# Patient Record
Sex: FEMALE | Race: WHITE | NOT HISPANIC OR LATINO | Employment: UNEMPLOYED | URBAN - METROPOLITAN AREA
[De-identification: names, ages, dates, MRNs, and addresses within clinical notes are randomized per-mention and may not be internally consistent; named-entity substitution may affect disease eponyms.]

---

## 2017-02-14 ENCOUNTER — HOSPITAL ENCOUNTER (EMERGENCY)
Facility: MEDICAL CENTER | Age: 32
End: 2017-02-14
Attending: EMERGENCY MEDICINE
Payer: MEDICAID

## 2017-02-14 ENCOUNTER — APPOINTMENT (OUTPATIENT)
Dept: RADIOLOGY | Facility: MEDICAL CENTER | Age: 32
End: 2017-02-14
Attending: EMERGENCY MEDICINE
Payer: MEDICAID

## 2017-02-14 VITALS
RESPIRATION RATE: 16 BRPM | HEART RATE: 104 BPM | WEIGHT: 191.14 LBS | TEMPERATURE: 99.1 F | OXYGEN SATURATION: 95 % | SYSTOLIC BLOOD PRESSURE: 140 MMHG | HEIGHT: 67 IN | BODY MASS INDEX: 30 KG/M2 | DIASTOLIC BLOOD PRESSURE: 75 MMHG

## 2017-02-14 DIAGNOSIS — K52.9 COLITIS: ICD-10-CM

## 2017-02-14 LAB
ALBUMIN SERPL BCP-MCNC: 4.3 G/DL (ref 3.2–4.9)
ALBUMIN/GLOB SERPL: 1.2 G/DL
ALP SERPL-CCNC: 65 U/L (ref 30–99)
ALT SERPL-CCNC: 21 U/L (ref 2–50)
ANION GAP SERPL CALC-SCNC: 8 MMOL/L (ref 0–11.9)
APPEARANCE UR: ABNORMAL
AST SERPL-CCNC: 29 U/L (ref 12–45)
BASOPHILS # BLD AUTO: 0.2 % (ref 0–1.8)
BASOPHILS # BLD: 0.02 K/UL (ref 0–0.12)
BILIRUB SERPL-MCNC: 0.6 MG/DL (ref 0.1–1.5)
BUN SERPL-MCNC: 14 MG/DL (ref 8–22)
CALCIUM SERPL-MCNC: 9.8 MG/DL (ref 8.4–10.2)
CHLORIDE SERPL-SCNC: 98 MMOL/L (ref 96–112)
CO2 SERPL-SCNC: 28 MMOL/L (ref 20–33)
COLOR UR: ABNORMAL
CREAT SERPL-MCNC: 1.22 MG/DL (ref 0.5–1.4)
EOSINOPHIL # BLD AUTO: 0.04 K/UL (ref 0–0.51)
EOSINOPHIL NFR BLD: 0.4 % (ref 0–6.9)
ERYTHROCYTE [DISTWIDTH] IN BLOOD BY AUTOMATED COUNT: 44.3 FL (ref 35.9–50)
GFR SERPL CREATININE-BSD FRML MDRD: 51 ML/MIN/1.73 M 2
GLOBULIN SER CALC-MCNC: 3.5 G/DL (ref 1.9–3.5)
GLUCOSE SERPL-MCNC: 123 MG/DL (ref 65–99)
GLUCOSE UR STRIP.AUTO-MCNC: NEGATIVE MG/DL
HCG UR QL: NEGATIVE
HCT VFR BLD AUTO: 42 % (ref 37–47)
HGB BLD-MCNC: 14.3 G/DL (ref 12–16)
IMM GRANULOCYTES # BLD AUTO: 0.03 K/UL (ref 0–0.11)
IMM GRANULOCYTES NFR BLD AUTO: 0.3 % (ref 0–0.9)
KETONES UR STRIP.AUTO-MCNC: ABNORMAL MG/DL
LEUKOCYTE ESTERASE UR QL STRIP.AUTO: NEGATIVE
LIPASE SERPL-CCNC: 28 U/L (ref 7–58)
LYMPHOCYTES # BLD AUTO: 1.51 K/UL (ref 1–4.8)
LYMPHOCYTES NFR BLD: 13.3 % (ref 22–41)
MCH RBC QN AUTO: 31.8 PG (ref 27–33)
MCHC RBC AUTO-ENTMCNC: 34 G/DL (ref 33.6–35)
MCV RBC AUTO: 93.5 FL (ref 81.4–97.8)
MONOCYTES # BLD AUTO: 0.52 K/UL (ref 0–0.85)
MONOCYTES NFR BLD AUTO: 4.6 % (ref 0–13.4)
NEUTROPHILS # BLD AUTO: 9.26 K/UL (ref 2–7.15)
NEUTROPHILS NFR BLD: 81.2 % (ref 44–72)
NITRITE UR QL STRIP.AUTO: NEGATIVE
NRBC # BLD AUTO: 0 K/UL
NRBC BLD AUTO-RTO: 0 /100 WBC
PH UR STRIP.AUTO: 5.5 [PH]
PLATELET # BLD AUTO: 252 K/UL (ref 164–446)
PMV BLD AUTO: 8.9 FL (ref 9–12.9)
POTASSIUM SERPL-SCNC: 3.3 MMOL/L (ref 3.6–5.5)
PROT SERPL-MCNC: 7.8 G/DL (ref 6–8.2)
PROT UR QL STRIP: 30 MG/DL
RBC # BLD AUTO: 4.49 M/UL (ref 4.2–5.4)
RBC UR QL AUTO: ABNORMAL
SODIUM SERPL-SCNC: 134 MMOL/L (ref 135–145)
SP GR UR STRIP.AUTO: >=1.03
WBC # BLD AUTO: 11.4 K/UL (ref 4.8–10.8)

## 2017-02-14 PROCEDURE — 96376 TX/PRO/DX INJ SAME DRUG ADON: CPT

## 2017-02-14 PROCEDURE — 99285 EMERGENCY DEPT VISIT HI MDM: CPT

## 2017-02-14 PROCEDURE — 81002 URINALYSIS NONAUTO W/O SCOPE: CPT

## 2017-02-14 PROCEDURE — 96374 THER/PROPH/DIAG INJ IV PUSH: CPT

## 2017-02-14 PROCEDURE — 80053 COMPREHEN METABOLIC PANEL: CPT

## 2017-02-14 PROCEDURE — 85025 COMPLETE CBC W/AUTO DIFF WBC: CPT

## 2017-02-14 PROCEDURE — 96361 HYDRATE IV INFUSION ADD-ON: CPT

## 2017-02-14 PROCEDURE — 36415 COLL VENOUS BLD VENIPUNCTURE: CPT

## 2017-02-14 PROCEDURE — 81025 URINE PREGNANCY TEST: CPT

## 2017-02-14 PROCEDURE — 700117 HCHG RX CONTRAST REV CODE 255: Performed by: EMERGENCY MEDICINE

## 2017-02-14 PROCEDURE — 700105 HCHG RX REV CODE 258: Performed by: EMERGENCY MEDICINE

## 2017-02-14 PROCEDURE — 83690 ASSAY OF LIPASE: CPT

## 2017-02-14 PROCEDURE — 96375 TX/PRO/DX INJ NEW DRUG ADDON: CPT

## 2017-02-14 PROCEDURE — 700111 HCHG RX REV CODE 636 W/ 250 OVERRIDE (IP): Performed by: EMERGENCY MEDICINE

## 2017-02-14 PROCEDURE — 74177 CT ABD & PELVIS W/CONTRAST: CPT

## 2017-02-14 RX ORDER — ONDANSETRON 2 MG/ML
4 INJECTION INTRAMUSCULAR; INTRAVENOUS ONCE
Status: COMPLETED | OUTPATIENT
Start: 2017-02-14 | End: 2017-02-14

## 2017-02-14 RX ORDER — PROMETHAZINE HYDROCHLORIDE 25 MG/1
25 TABLET ORAL EVERY 6 HOURS PRN
Qty: 15 TAB | Refills: 0 | Status: SHIPPED | OUTPATIENT
Start: 2017-02-14

## 2017-02-14 RX ORDER — TRAZODONE HYDROCHLORIDE 150 MG/1
200 TABLET ORAL NIGHTLY
COMMUNITY

## 2017-02-14 RX ORDER — HYDROCODONE BITARTRATE AND ACETAMINOPHEN 5; 325 MG/1; MG/1
1 TABLET ORAL EVERY 6 HOURS PRN
Qty: 5 TAB | Refills: 0 | Status: SHIPPED | OUTPATIENT
Start: 2017-02-14

## 2017-02-14 RX ORDER — SODIUM CHLORIDE 9 MG/ML
1000 INJECTION, SOLUTION INTRAVENOUS ONCE
Status: COMPLETED | OUTPATIENT
Start: 2017-02-14 | End: 2017-02-14

## 2017-02-14 RX ORDER — METRONIDAZOLE 500 MG/1
500 TABLET ORAL 3 TIMES DAILY
Qty: 30 TAB | Refills: 0 | Status: SHIPPED | OUTPATIENT
Start: 2017-02-14 | End: 2019-10-02

## 2017-02-14 RX ADMIN — HYDROMORPHONE HYDROCHLORIDE 1 MG: 1 INJECTION, SOLUTION INTRAMUSCULAR; INTRAVENOUS; SUBCUTANEOUS at 18:51

## 2017-02-14 RX ADMIN — SODIUM CHLORIDE 1000 ML: 9 INJECTION, SOLUTION INTRAVENOUS at 18:51

## 2017-02-14 RX ADMIN — ONDANSETRON 4 MG: 2 INJECTION, SOLUTION INTRAMUSCULAR; INTRAVENOUS at 20:29

## 2017-02-14 RX ADMIN — ONDANSETRON 4 MG: 2 INJECTION, SOLUTION INTRAMUSCULAR; INTRAVENOUS at 18:51

## 2017-02-14 RX ADMIN — HYDROMORPHONE HYDROCHLORIDE 0.5 MG: 1 INJECTION, SOLUTION INTRAMUSCULAR; INTRAVENOUS; SUBCUTANEOUS at 20:29

## 2017-02-14 RX ADMIN — IOHEXOL 100 ML: 350 INJECTION, SOLUTION INTRAVENOUS at 19:49

## 2017-02-14 ASSESSMENT — PAIN SCALES - GENERAL
PAINLEVEL_OUTOF10: 10
PAINLEVEL_OUTOF10: 5

## 2017-02-14 NOTE — ED AVS SNAPSHOT
2/14/2017          Socorro Kirsten Rivera  [address] [facility] [address]    Dear Socorro:    [facility] wants to ensure your discharge home is safe and you or your loved ones have had all your questions answered regarding your care after you leave the hospital.    You may receive a telephone call within two days of your discharge.  This call is to make certain you understand your discharge instructions as well as ensure we provided you with the best care possible during your stay with us.     The call will only last approximately 3-5 minutes and will be done by a nurse.    Once again, we want to ensure your discharge home is safe and that you have a clear understanding of any next steps in your care.  If you have any questions or concerns, please do not hesitate to contact us, we are here for you.  Thank you for choosing [facility] [place] for your healthcare needs.    Sincerely,    Delvin Tapia    [facility]

## 2017-02-14 NOTE — ED AVS SNAPSHOT
Home Care Instructions                                                                                                                Socorro Rivera   MRN: [id number]    Department:  [facility], Emergency Dept   Date of Visit:  2/14/2017            [facility], Emergency Dept    [address]    [place] [address]    Phone:  [phone]      You were seen by     Grace Ronquillo M.D.      Your Diagnosis Was     Colitis     K52.9       These are the medications you received during your hospitalization from 02/14/2017 1733 to 02/14/2017 2022     Date/Time Order Dose Route Action    02/14/2017 1851 NS infusion 1,000 mL 1,000 mL Intravenous New Bag    02/14/2017 1851 HYDROmorphone (DILAUDID) injection 1 mg 1 mg Intravenous Given    02/14/2017 1851 ondansetron (ZOFRAN) syringe/vial injection 4 mg 4 mg Intravenous Given    02/14/2017 1949 iohexol (OMNIPAQUE) 350 mg/mL 100 mL Intravenous Given      Follow-up Information     1. Schedule an appointment as soon as possible for a visit with [facility].    Why:  please call tomorrow morning to schedule a follow-up appointment    Contact information    [address] [place] [address] [phone]        2. Go to [facility], Emergency Dept.    Specialty:  Emergency Medicine    Why:  If symptoms worsen or are not improved in 24 hours    Contact information    [address] [place] [phone]      Medication Information     Review all of your home medications and newly ordered medications with your primary doctor and/or pharmacist as soon as possible. Follow medication instructions as directed by your doctor and/or pharmacist.     Please keep your complete medication list with you and share with your physician. Update the information when medications are discontinued, doses are changed, or new medications (including over-the-counter  products) are added; and carry medication information at all times in the event of emergency situations.               Medication List      START taking these medications        Instructions    hydrocodone-acetaminophen 5-325 MG Tabs per tablet   Commonly known as:  NORCO    Take 1 Tab by mouth every 6 hours as needed.   Dose:  1 Tab       metronidazole 500 MG Tabs   Commonly known as:  FLAGYL    Take 1 Tab by mouth 3 times a day.   Dose:  500 mg       promethazine 25 MG Tabs   Commonly known as:  PHENERGAN    Take 1 Tab by mouth every 6 hours as needed for Nausea/Vomiting.   Dose:  25 mg         ASK your doctor about these medications        Instructions    ondansetron 4 MG Tbdp   Commonly known as:  ZOFRAN ODT    Take 4 mg by mouth every 8 hours as needed for Nausea/Vomiting.   Dose:  4 mg       oxycodone-acetaminophen  MG Tabs   Commonly known as:  PERCOCET-10    Take 1-2 Tabs by mouth every four hours as needed for Severe Pain.   Dose:  1-2 Tab       trazodone 150 MG Tabs   Commonly known as:  DESYREL    Take 200 mg by mouth every evening.   Dose:  200 mg               Procedures and tests performed during your visit     CARDIAC MONITORING    CBC WITH DIFFERENTIAL    COMP METABOLIC PANEL    CONSENT FOR CONTRAST INJECTION    CT-ABDOMEN-PELVIS WITH    ESTIMATED GFR    LIPASE    O2 Protocol    POC UA    POC URINE PREGNANCY    SALINE LOCK        Discharge Instructions       Please follow-up with the gastroenterologist listed, call tomorrow morning to schedule a follow-up appointment. Return to the emergency department if you develop any new or worsening symptoms including worsening pain, fevers, worsening diarrhea, or any further concerns. As we discussed, it is possible that your pain and diarrhea will go away on its own in 12-24 hours. For that reason it is very reasonable to wait to begin your antibiotics. It is possible that taking antibiotics could cause worsening diarrhea or a secondary  infection.      Colitis  Colitis is inflammation of the colon. Colitis can be a short-term or long-standing (chronic) illness. Crohn's disease and ulcerative colitis are 2 types of colitis which are chronic. They usually require lifelong treatment.  CAUSES   There are many different causes of colitis, including:  · Viruses.  · Germs (bacteria).  · Medicine reactions.  SYMPTOMS   · Diarrhea.  · Intestinal bleeding.  · Pain.  · Fever.  · Throwing up (vomiting).  · Tiredness (fatigue).  · Weight loss.  · Bowel blockage.  DIAGNOSIS   The diagnosis of colitis is based on examination and stool or blood tests. X-rays, CT scan, and colonoscopy may also be needed.  TREATMENT   Treatment may include:  · Fluids given through the vein (intravenously).  · Bowel rest (nothing to eat or drink for a period of time).  · Medicine for pain and diarrhea.  · Medicines (antibiotics) that kill germs.  · Cortisone medicines.  · Surgery.  HOME CARE INSTRUCTIONS   · Get plenty of rest.  · Drink enough water and fluids to keep your urine clear or pale yellow.  · Eat a well-balanced diet.  · Call your caregiver for follow-up as recommended.  SEEK IMMEDIATE MEDICAL CARE IF:   · You develop chills.  · You have an oral temperature above 102° F (38.9° C), not controlled by medicine.  · You have extreme weakness, fainting, or dehydration.  · You have repeated vomiting.  · You develop severe belly (abdominal) pain or are passing bloody or tarry stools.  MAKE SURE YOU:   · Understand these instructions.  · Will watch your condition.  · Will get help right away if you are not doing well or get worse.     This information is not intended to replace advice given to you by your health care provider. Make sure you discuss any questions you have with your health care provider.     Document Released: 01/25/2006 Document Revised: 03/11/2013 Document Reviewed: 04/11/2016  [employer] Interactive Patient Education ©2016 [employer]            Patient  Information     Patient Information    Following emergency treatment: all patient requiring follow-up care must return either to a private physician or a clinic if your condition worsens before you are able to obtain further medical attention, please return to the emergency room.     Billing Information    At [facility], we work to make the billing process streamlined for our patients.  Our Representatives are here to answer any questions you may have regarding your hospital bill.  If you have insurance coverage and have supplied your insurance information to us, we will submit a claim to your insurer on your behalf.  Should you have any questions regarding your bill, we can be reached online or by phone as follows:  Online: You are able pay your bills online or live chat with our representatives about any billing questions you may have. We are here to help Monday - Friday from 8:00am to 7:30pm and 9:00am - 12:00pm on Saturdays.  Please visit [email or web address]  for more information.   Phone:  [phone] or [phone]    Please note that your emergency physician, surgeon, pathologist, radiologist, anesthesiologist, and other specialists are not employed by [facility] and will therefore bill separately for their services.  Please contact them directly for any questions concerning their bills at the numbers below:     Emergency Physician Services:  [phone]  [facility]:  [phone]  [facility]:  [phone]  [facility]:  [phone]    1. Your final bill may vary from the amount quoted upon discharge if all procedures are not complete at that time, or if your doctor has additional procedures of which we are not aware. You will receive an additional bill if you return to the Emergency Department at [facility] for suture removal regardless of the facility of which the sutures were placed.     2. Please arrange for  settlement of this account at the emergency registration.    3. All self-pay accounts are due in full at the time of treatment.  If you are unable to meet this obligation then payment is expected within 4-5 days.     4. If you have had radiology studies (CT, X-ray, Ultrasound, MRI), you have received a preliminary result during your emergency department visit. Please contact the radiology department [phone] to receive a copy of your final result. Please discuss the Final result with your primary physician or with the follow up physician provided.     Crisis Hotline:  [place] Crisis Hotline:  3-933-WMVISCE or 1-180.376.1883  [place] Crisis Hotline:    1-587.800.4507 or 710-480-4998         ED Discharge Follow Up Questions    1. In order to provide you with very good care, we would like to follow up with a phone call in the next few days.  May we have your permission to contact you?     YES /  NO    2. What is the best phone number to call you? (       )_____-__________    3. What is the best time to call you?      Morning  /  Afternoon  /  Evening                   Patient Signature:  ____________________________________________________________    Date:  ____________________________________________________________

## 2017-02-15 NOTE — ED NOTES
Pt instructed to have someone to drive home after narcotic administration. Pt agreed on not driving after receiving narcotics. Pt medicated as directed by ERP.

## 2017-02-15 NOTE — ED NOTES
Medicated as ordered.  NS infusing.  Mom at BS and call light in reach.  Aware to call for any needs/changes.  Given one warm blanket.

## 2017-02-15 NOTE — DISCHARGE INSTRUCTIONS
Please follow-up with the gastroenterologist listed, call tomorrow morning to schedule a follow-up appointment. Return to the emergency department if you develop any new or worsening symptoms including worsening pain, fevers, worsening diarrhea, or any further concerns. As we discussed, it is possible that your pain and diarrhea will go away on its own in 12-24 hours. For that reason it is very reasonable to wait to begin your antibiotics. It is possible that taking antibiotics could cause worsening diarrhea or a secondary infection.      Colitis  Colitis is inflammation of the colon. Colitis can be a short-term or long-standing (chronic) illness. Crohn's disease and ulcerative colitis are 2 types of colitis which are chronic. They usually require lifelong treatment.  CAUSES   There are many different causes of colitis, including:  · Viruses.  · Germs (bacteria).  · Medicine reactions.  SYMPTOMS   · Diarrhea.  · Intestinal bleeding.  · Pain.  · Fever.  · Throwing up (vomiting).  · Tiredness (fatigue).  · Weight loss.  · Bowel blockage.  DIAGNOSIS   The diagnosis of colitis is based on examination and stool or blood tests. X-rays, CT scan, and colonoscopy may also be needed.  TREATMENT   Treatment may include:  · Fluids given through the vein (intravenously).  · Bowel rest (nothing to eat or drink for a period of time).  · Medicine for pain and diarrhea.  · Medicines (antibiotics) that kill germs.  · Cortisone medicines.  · Surgery.  HOME CARE INSTRUCTIONS   · Get plenty of rest.  · Drink enough water and fluids to keep your urine clear or pale yellow.  · Eat a well-balanced diet.  · Call your caregiver for follow-up as recommended.  SEEK IMMEDIATE MEDICAL CARE IF:   · You develop chills.  · You have an oral temperature above 102° F (38.9° C), not controlled by medicine.  · You have extreme weakness, fainting, or dehydration.  · You have repeated vomiting.  · You develop severe belly (abdominal) pain or are passing  bloody or tarry stools.  MAKE SURE YOU:   · Understand these instructions.  · Will watch your condition.  · Will get help right away if you are not doing well or get worse.     This information is not intended to replace advice given to you by your health care provider. Make sure you discuss any questions you have with your health care provider.     Document Released: 01/25/2006 Document Revised: 03/11/2013 Document Reviewed: 04/11/2016  [employer] Interactive Patient Education ©2016 [employer]

## 2017-02-15 NOTE — ED NOTES
Discharge information and prescription information provided. Pt verbalized understanding of discharge instructions to follow up with PCP and to return to ER if condition worsens. Pt expressed the awareness of not driving or operating heavy machinery. Pt ambulated out of ER in a steady gait. Mother to drive home

## 2017-02-15 NOTE — ED PROVIDER NOTES
"ED Provider Note    Chief Complaint:   Abdominal pain    HPI:  Socorro Rievra is a 31 y.o. female who presents with abdominal pain, onset 3 hours ago after eating. Described as cramping initially localized to the left periumbilical area now radiating to the right back. Described as migratory and colicky. No exacerbating factors, alleviated after an episode of diarrhea and vomiting. No associated fevers, no headache or neck pain. No chest pain, no shortness of breath. She is having normal bowel movements most recently diarrhea a few hours ago. No history of similar symptoms, no family members with similar illness. No history of abdominal disorder.    No abnormal rashes or lesions, no impaired immunity, no history of previous similar symptoms, no hyperglycemia. No associated fevers.     Review of Systems:  See HPI for pertinent positives and negatives. All other systems negative.    Past Medical History:   has a past medical history of Psychiatric disorder and Chronic back pain.    Social History:  Social History     Social History Main Topics   • Smoking status: Current Every Day Smoker -- 0.25 packs/day     Types: Cigarettes   • Smokeless tobacco: Not on file   • Alcohol Use: Yes      Comment: occass   • Drug Use: No   • Sexual Activity: Not on file       Surgical History:  patient denies any surgical history    Current Medications:  Home Medications     Reviewed by Smith Willingham R.N. (Registered Nurse) on 02/14/17 at 7534  Med List Status: Partial    Medication Last Dose Status    ondansetron (ZOFRAN ODT) 4 MG TABLET DISPERSIBLE prn Active    Oxycodone-Acetaminophen (PERCOCET-10)  MG Tab 11/25/2015 Active    trazodone (DESYREL) 150 MG Tab  Active                Allergies:  No Known Allergies    Physical Exam:  Vital Signs: /75 mmHg  Pulse 104  Temp(Src) 37.3 °C (99.1 °F)  Resp 16  Ht 1.702 m (5' 7.01\")  Wt 86.7 kg (191 lb 2.2 oz)  BMI 29.93 kg/m2  SpO2 95%  LMP " 02/13/2017  Constitutional: Alert, no acute distress  HENT: Normocephalic, atraumatic, moist mucus membranes  Eyes: Pupils equal and reactive, normal conjunctiva, non-icteric  Neck: Supple, normal range of motion, no stridor  Cardiovascular: Normal peripheral perfusion, no murmer, no cyanosis, normal cardiac auscultation  Pulmonary: No respiratory distress, normal work of breathing, no accessory muscule usage, breath sounds clear and equal bilaterally  Abdomen: Bowel sounds present, obese, soft, nondistended, diffuse discomfort on abdominal palpation without localizable pain, no right CVA tenderness, no overlying skin changes  Skin: Warm, dry, no rashes or lesions  Back: No pain with active range of motion  Musculoskeletal: Normal range of motion in all extremities, no swelling or deformity noted  Neurologic: Alert, oriented, normal motor function, no speech deficits  Psychiatric: Normal and appropriate mood and affect    Labs:  Labs Reviewed   CBC WITH DIFFERENTIAL - Abnormal; Notable for the following:     WBC 11.4 (*)     MPV 8.9 (*)     Neutrophils-Polys 81.20 (*)     Lymphocytes 13.30 (*)     Neutrophils (Absolute) 9.26 (*)     All other components within normal limits   COMP METABOLIC PANEL - Abnormal; Notable for the following:     Sodium 134 (*)     Potassium 3.3 (*)     Glucose 123 (*)     All other components within normal limits   ESTIMATED GFR - Abnormal; Notable for the following:     GFR If Non  51 (*)     All other components within normal limits   POC UA - Abnormal; Notable for the following:     POC Appearance Slightly Cloudy (*)     POC Ketones Trace (*)     POC Specific Gravity >=1.030 (*)     POC Blood Large (*)     POC Protein 30 (*)     All other components within normal limits   LIPASE   POC URINE PREGNANCY       Radiology:  CT-ABDOMEN-PELVIS WITH   Final Result      1.  Findings consistent with colitis involving descending and sigmoid colon.   2.  No abscess or evidence for  bowel perforation.   3.  Normal appendix.   4.  IUD in place.           Differential diagnosis:  Cholecystitis, appendicitis, colitis, gastroenteritis, cystitis, pyelonephritis    MDM:  History and physical exam as documented above. Briefly this is a 31-year-old woman with acute onset abdominal pain associated with vomiting and diarrhea, no fevers. She was treated on arrival to the emergency department with Zofran, Dilaudid and fluid bolus.    Laboratory evaluation reveals negative urine pregnancy, white blood count mildly elevated to 11.4 with no bands resulted at this time. Potassium is low, 3.3, likely secondary to vomiting and diarrheal illness. No other significant electrolyte abnormalities. Urinalysis negative for evidence of infection, blood present likely due to patients current menstrual cycle.    Out of concern for appendicitis in this patient with obesity limiting abdominal exam and elevated white blood count CT abdomen and pelvis was ordered for evaluation. This was consistent with colitis as described above. Normal-appearing appendix, no evidence of abscess or bowel perforation.    On reassessment symptoms are controlled with Zofran, Dilaudid and fluids. Plan at this time is for discharge home with close follow-up. She'll be given Zofran and a few doses of Norco for pain. Additionally I did discharge her with a prescription for metronidazole. Explained that her symptoms may be self-limiting and may resolve rapidly within 12-24 hours and she did have abrupt onset and alleviation after vomiting and diarrhea. Instructed her that it was reasonable to wait 12-24 hours before starting metronidazole as this may be viral in origin. Additionally explained that this could be a very early presentation of a more sinister process, stressed the importance of closely monitoring her condition returning to the emergency department immediately if she develops worsening symptoms. Additionally, she will return for recheck  to the emergency department or her primary care physician within 24 hours if symptoms have not completely resolved.    Instructed to contact gastroenterology tomorrow to schedule a follow-up appointment.    Blood pressure today is greater than 120/80, pt instructed to follow up with primary care for recheck     database query prior to narcotic prescription, no concerning patterns found    Disposition:  Discharge home in stable condition    Final Impression:  Colitis    Electronically signed by: Grace Ronquillo, 2/14/2017 6:48 PM

## 2019-10-01 ENCOUNTER — HOSPITAL ENCOUNTER (EMERGENCY)
Facility: MEDICAL CENTER | Age: 34
End: 2019-10-02
Attending: EMERGENCY MEDICINE
Payer: MEDICAID

## 2019-10-01 VITALS
HEIGHT: 66 IN | HEART RATE: 94 BPM | WEIGHT: 146.83 LBS | DIASTOLIC BLOOD PRESSURE: 101 MMHG | BODY MASS INDEX: 23.6 KG/M2 | RESPIRATION RATE: 16 BRPM | TEMPERATURE: 97.5 F | SYSTOLIC BLOOD PRESSURE: 146 MMHG

## 2019-10-01 DIAGNOSIS — R10.30 LOWER ABDOMINAL PAIN: ICD-10-CM

## 2019-10-01 DIAGNOSIS — L02.91 ABSCESS: ICD-10-CM

## 2019-10-01 PROCEDURE — 99283 EMERGENCY DEPT VISIT LOW MDM: CPT

## 2019-10-01 SDOH — HEALTH STABILITY: MENTAL HEALTH: HOW OFTEN DO YOU HAVE A DRINK CONTAINING ALCOHOL?: MONTHLY OR LESS

## 2019-10-02 ENCOUNTER — APPOINTMENT (OUTPATIENT)
Dept: RADIOLOGY | Facility: MEDICAL CENTER | Age: 34
End: 2019-10-02
Attending: EMERGENCY MEDICINE
Payer: MEDICAID

## 2019-10-02 PROCEDURE — 76856 US EXAM PELVIC COMPLETE: CPT

## 2019-10-02 PROCEDURE — 76705 ECHO EXAM OF ABDOMEN: CPT

## 2019-10-02 RX ORDER — SULFAMETHOXAZOLE AND TRIMETHOPRIM 800; 160 MG/1; MG/1
1 TABLET ORAL 2 TIMES DAILY
Qty: 10 TAB | Refills: 0 | Status: SHIPPED | OUTPATIENT
Start: 2019-10-02 | End: 2019-10-07

## 2019-10-02 RX ORDER — CEPHALEXIN 500 MG/1
500 CAPSULE ORAL 4 TIMES DAILY
Qty: 20 CAP | Refills: 0 | Status: SHIPPED | OUTPATIENT
Start: 2019-10-02 | End: 2019-10-07

## 2019-10-02 ASSESSMENT — LIFESTYLE VARIABLES: DO YOU DRINK ALCOHOL: NO

## 2019-10-02 NOTE — ED PROVIDER NOTES
ED Provider Note    Scribed for Lavonne Gonzalez D.O. by Nia Dominguez. 10/1/2019, 11:41 PM.    Primary care provider: Jefe Ortiz M.D.  Means of arrival: Walk in   History obtained from: Patient  History limited by: None     CHIEF COMPLAINT  Chief Complaint   Patient presents with   • Nodule     Bump in center of pelvic region that is raised and tender x6 days. No redness noted and pt denies injury.   • Low Back Pain     x6 days, radiates into rectum, pt denies injury       HPI  Socorro Rivera is a 34 y.o. female with history of chronic back painwho presents to the Emergency Department for evaluation of worsening lower back pain onset 6 days ago. Patient reports her pain has radiated into her rectum, causing further pain. She adds that this pain is different from her other episodes of back pain. Denies trouble walking, loss of control of bowels, numbness or tingling. She denies any injury to the region.     Patient also complains about a raised, tender nodule located in the center of the pelvic region onset 6 days ago. She states the nodule is painful. She presents associated symptoms of trouble in urination, and nausea. Denies hematochezia, diarrhea, cough, congestion, loss of consciousness, or chest pain. Patient denies any injury to the region. Patient has an IUD in place for the last 12 years. Her last menstrual period was the 22nd of September. She states there is no concern for STD at this time and is sexually active with one partner.    REVIEW OF SYSTEMS  Pertinent positives include lower back pain, raised nodule pain, rectal pain, trouble in urination, and nausea. Pertinent negatives include no trouble walking, loss of control of bowels, numbness or tingling, hematochezia, diarrhea, cough, congestion, loss of consciousness, or chest pain.  All other systems reviewed and negative.     PAST MEDICAL HISTORY   has a past medical history of Chronic back pain and Psychiatric disorder.    SURGICAL HISTORY    "has a past surgical history that includes primary c section; breast reconstruction; and abdominoplasty.    SOCIAL HISTORY  Social History     Tobacco Use   • Smoking status: Current Every Day Smoker     Packs/day: 0.25     Types: Cigarettes   • Smokeless tobacco: Never Used   Substance Use Topics   • Alcohol use: Yes     Frequency: Monthly or less     Comment: occass   • Drug use: No      Social History     Substance and Sexual Activity   Drug Use No       FAMILY HISTORY  History reviewed. No pertinent family history.    CURRENT MEDICATIONS  Reviewed.  See Encounter Summary.     ALLERGIES  No Known Allergies    PHYSICAL EXAM  VITAL SIGNS: /101   Pulse 94   Temp 36.4 °C (97.5 °F) (Temporal)   Resp 16   Ht 1.676 m (5' 6\")   Wt 66.6 kg (146 lb 13.2 oz)   LMP 09/22/2019   BMI 23.70 kg/m²   Constitutional: Alert and in no apparent distress.  HENT: Normocephalic atraumatic. Bilateral external ears normal. Nose normal. Mucous membranes are moist.  Eyes: Pupils are equal and reactive. Conjunctiva normal. Non-icteric sclera.   Neck: Normal range of motion without tenderness. Supple. No meningeal signs.  Cardiovascular: Regular rate and rhythm. No murmurs, gallops or rubs.  Thorax & Lungs: Breath sounds are clear to auscultation bilaterally. No wheezing, rhonchi or rales.  Abdomen: Soft, nontender and nondistended. No peritoneal signs noted.  Skin: Warm and dry. No rashes are noted.  Back: No bony tenderness, No CVA tenderness.   Extremities: 2+ peripheral pulses. Cap refill is less than 2 seconds. No edema, cyanosis, or clubbing.  Musculoskeletal: Good range of motion in all major joints. No tenderness to palpation or major deformities noted.   Neurologic: Alert and oriented ×3. The patient moves all 4 extremities and follows commands.  Psychiatric: Affect is normal. Judgment appears to be intact.    DIAGNOSTIC STUDIES / PROCEDURES     LABS  {thisvisit}  All labs were reviewed by me.    EKG  EKG was performed at " *** shows normal sinus rhythm with heart rate of ***. DE interval is ***. QT/QTc are ***/***. Axis appears normal. No acute ST elevations or depressions are noted. Impression: ***      RADIOLOGY  No orders to display     The radiologist's interpretation of all radiological studies have been reviewed by me.    COURSE & MEDICAL DECISION MAKING  Pertinent Labs & Imaging studies reviewed. (See chart for details)    11:41 PM - Patient seen and examined at bedside. Patient will be treated with Ultrasound Pelvis, . Ordered *** to evaluate her symptoms.     Decision Making:  This is a 34 y.o. year old female who presents with ***    FINAL IMPRESSION  No diagnosis found.      Nia LOPEZ (Scribe), am scribing for, and in the presence of, Lavonne Gonzalez D.O..    Electronically signed by: Nia Dominguez (Scribe), 10/1/2019    Lavonne LOPEZ D.O. personally performed the services described in this documentation, as scribed by Nia Dominguez in my presence, and it is both accurate and complete.    {ERP Attestation (ERP ONLY):200109}

## 2019-10-02 NOTE — ED NOTES
Pt out of her room asking how to get out of the hospital. RN educated the patient her care has not been completed. Pt refusing blood draw and remaining tests reporting increased anxiety. ERP back to bedside.

## 2019-10-02 NOTE — ED PROVIDER NOTES
ED Provider Note    Scribed for Lavonne Gonzalez D.O. by Nia Dominguez. 10/2/2019, 12:06 AM.    Primary care provider: Jefe Ortiz M.D.  Means of arrival: Walk In   History obtained from: Patient  History limited by: None    CHIEF COMPLAINT  Chief Complaint   Patient presents with   • Nodule     Bump in center of pelvic region that is raised and tender x6 days. No redness noted and pt denies injury.   • Low Back Pain     x6 days, radiates into rectum, pt denies injury       HPI  Socorro Rivera is a 34 y.o. female with history of chronic back painwho presents to the Emergency Department for evaluation of worsening lower abdominal onset 6 days ago.  Patient also complains about a raised, tender nodule located in the suprapubic area. She states the nodule is painful. She presents associated symptoms of trouble in urination, and nausea.  She denies diarrhea, cough, congestion, loss of consciousness, or chest pain.  She also denies any vaginal discharge and is not concerned about STDs as she is monogamous with one partner.  She has a history of chronic back pain but denies any urinary retention, bowel incontinence, or saddle anesthesia.  She states her back pain is closely similar just a little worse on the right.    REVIEW OF SYSTEMS  Pertinent positives include lower back pain, raised nodule in the suprapubic area, and nausea. Pertinent negatives include no trouble walking, loss of control of bowels, numbness or tingling, hematochezia, diarrhea, cough, congestion, loss of consciousness, or chest pain.  All other systems reviewed and negative.     PAST MEDICAL HISTORY   has a past medical history of Chronic back pain and Psychiatric disorder.    SURGICAL HISTORY   has a past surgical history that includes primary c section; breast reconstruction; and abdominoplasty.    SOCIAL HISTORY  Social History     Tobacco Use   • Smoking status: Current Every Day Smoker     Packs/day: 0.25     Types: Cigarettes   •  "Smokeless tobacco: Never Used   Substance Use Topics   • Alcohol use: Yes     Frequency: Monthly or less     Comment: occass   • Drug use: No      Social History     Substance and Sexual Activity   Drug Use No       FAMILY HISTORY  History reviewed. No pertinent family history.    CURRENT MEDICATIONS  Reviewed.  See Encounter Summary.     ALLERGIES  No Known Allergies    PHYSICAL EXAM  VITAL SIGNS: /101   Pulse 94   Temp 36.4 °C (97.5 °F) (Temporal)   Resp 16   Ht 1.676 m (5' 6\")   Wt 66.6 kg (146 lb 13.2 oz)   LMP 09/22/2019   BMI 23.70 kg/m²     Constitutional: Alert and in no apparent distress.  HENT: Normocephalic atraumatic. Bilateral external ears normal. Nose normal. Mucous membranes are moist.  Eyes: Pupils are equal and reactive. Conjunctiva normal. Non-icteric sclera.   Neck: Normal range of motion without tenderness. Supple. No meningeal signs.  Cardiovascular: Regular rate and rhythm. No murmurs, gallops or rubs.  Thorax & Lungs: Breath sounds are clear to auscultation bilaterally. No wheezing, rhonchi or rales.  Abdomen: Soft, nontender and nondistended. No peritoneal signs noted.  There is a 1 cm x 1 cm well-circumscribed hard nodule in the suprapubic area in the area where the patient has shaved.  No overlying erythema or warmth is noted.  No fluctuance is noted.  Skin: Warm and dry. No rashes are noted.  Back: No bony tenderness, No CVA tenderness.   Extremities: 2+ peripheral pulses. Cap refill is less than 2 seconds. No edema, cyanosis, or clubbing.  Musculoskeletal: Good range of motion in all major joints. No tenderness to palpation or major deformities noted.   Neurologic: Alert and oriented ×3. The patient moves all 4 extremities and follows commands.  Psychiatric: Affect is normal. Judgment appears to be intact.    DIAGNOSTIC STUDIES / PROCEDURES     RADIOLOGY  US-PELVIC COMPLETE (TRANSABDOMINAL/TRANSVAGINAL) (COMBO)   Final Result         1.  IUD in the endometrial canal, appears " grossly unremarkable.   2.  Nabothian cyst      US-ABDOMEN LTD (SOFT TISSUE)   Final Result         1.  Irregular hypoechoic area in the subcutaneous soft tissues with increased peripheral Doppler flow, appearance favors soft tissue abscess.        The radiologist's interpretation of all radiological studies have been reviewed by me.    COURSE & MEDICAL DECISION MAKING  Pertinent Labs & Imaging studies reviewed. (See chart for details)    12:06 AM - Patient seen and examined at bedside.Ordered US Pelvic, US abdomen, Lactic Acid, CBC with diff., CMP, Lipase, Urinalysis culture, HCG qual serum.     Decision Making:  This is a 34 y.o. year old female who presents with lower abdominal pain and palpable nodule.  On initial evaluation, she had a notable well-circumscribed nodule in the suprapubic area in the area where she shaved.  No overlying erythema or warmth was noted.  No fluctuance is noted.  I did obtain an ultrasound which revealed an irregular hypoechoic area in the subcutaneous soft tissue favoring a soft tissue abscess of small size.  When I reviewed the imaging there did appear to be some cobblestoning in the area as well.  She did not have any evidence of sepsis clinically and overall appeared well.  I did obtain an ultrasound of the pelvis as well which revealed that her IUD was in the appropriate location.  My plan had been to obtain labs including a urinalysis; however, the patient refused.  I had a lengthy discussion with her regarding the risks of leaving against AMA specifically without doing an I&D including worsening condition and even death.  She was alert and oriented x3 and did not appear intoxicated.  She seemed capable of making decisions for herself.  I encouraged her to stay and wrote her prescription for antibiotics.  I encouraged her to follow-up with a primary care physician although she denied having one.  She is given information for the [facility] clinic and also encouraged to return to the  ED immediately should she develop a fever, worsening pain, or persistent vomiting.    The patient has decided not to proceed with further recommended testing or treatment to determine the cause of their symptoms. The risks and alternatives to the recommendation were discussed and the patient voiced understanding. The patient appears clinically to have capacity to make this decision. The patient was instructed that they could return to the ER at any time to complete the testing or treatment    FINAL IMPRESSION  1. Abscess    2. Lower abdominal pain          Nia LOPEZ), am scribing for, and in the presence of, Lavonne Gonzalez D.O..    Electronically signed by: Nia Dominguez ([place]), 10/2/2019    ILavonne D.O. personally performed the services described in this documentation, as scribed by Nia Dominguez in my presence, and it is both accurate and complete.    C    The note accurately reflects work and decisions made by me.  Lavonne Gonzalez  10/2/2019  2:14 AM

## 2019-10-02 NOTE — ED NOTES
ERP back to bedside, reporting patient would like to now get her blood drawn and is compliant with POC.

## 2019-10-02 NOTE — ED TRIAGE NOTES
Pt ambulatory to triage for above complaint. Pt states just noticed the bump 6 days ago but doesn't know where it came from or what caused it. Bump is tender on palpation and is hard underneath skin. Low back pain is 8/10, per pt.    Pt is alert/oriented and follows commands. Pt speaking in full sentences and responds appropriately to questions. No acute distress noted in triage and respirations are even and unlabored.     Pt placed in lobby and educated on triage process. Pt encouraged to alert staff for any changes in condition.

## 2019-10-02 NOTE — ED NOTES
Pt given D/C paperwork with scripts. RN went to grab AMA paperwork and patient no longer present in room. Unable to sign paperwork at this time. AMA paperwork signed by RN and ERP and placed in chart.

## 2020-12-21 ENCOUNTER — HOSPITAL ENCOUNTER (OUTPATIENT)
Facility: MEDICAL CENTER | Age: 35
End: 2020-12-21
Payer: COMMERCIAL

## 2020-12-22 LAB
COVID ORDER STATUS COVID19: NORMAL
SARS-COV-2 RNA RESP QL NAA+PROBE: NOTDETECTED
SPECIMEN SOURCE: NORMAL

## 2021-09-07 ENCOUNTER — APPOINTMENT (OUTPATIENT)
Dept: URGENT CARE | Facility: CLINIC | Age: 36
End: 2021-09-07